# Patient Record
Sex: MALE | Race: WHITE | NOT HISPANIC OR LATINO | Employment: FULL TIME | URBAN - METROPOLITAN AREA
[De-identification: names, ages, dates, MRNs, and addresses within clinical notes are randomized per-mention and may not be internally consistent; named-entity substitution may affect disease eponyms.]

---

## 2017-03-12 ENCOUNTER — OFFICE VISIT (OUTPATIENT)
Dept: URGENT CARE | Facility: PHYSICIAN GROUP | Age: 47
End: 2017-03-12
Payer: COMMERCIAL

## 2017-03-12 VITALS
DIASTOLIC BLOOD PRESSURE: 94 MMHG | BODY MASS INDEX: 27.9 KG/M2 | SYSTOLIC BLOOD PRESSURE: 132 MMHG | HEART RATE: 70 BPM | OXYGEN SATURATION: 94 % | WEIGHT: 189 LBS | RESPIRATION RATE: 14 BRPM | TEMPERATURE: 98.5 F

## 2017-03-12 DIAGNOSIS — J06.9 UPPER RESPIRATORY TRACT INFECTION, UNSPECIFIED TYPE: ICD-10-CM

## 2017-03-12 PROCEDURE — 99214 OFFICE O/P EST MOD 30 MIN: CPT | Performed by: PHYSICIAN ASSISTANT

## 2017-03-12 RX ORDER — DEXTROMETHORPHAN HYDROBROMIDE AND PROMETHAZINE HYDROCHLORIDE 15; 6.25 MG/5ML; MG/5ML
SYRUP ORAL
Qty: 180 ML | Refills: 0 | Status: SHIPPED | OUTPATIENT
Start: 2017-03-12 | End: 2018-01-05

## 2017-03-12 RX ORDER — IBUPROFEN 600 MG/1
600 TABLET ORAL EVERY 8 HOURS PRN
Qty: 30 TAB | Refills: 0 | Status: SHIPPED | OUTPATIENT
Start: 2017-03-12

## 2017-03-12 NOTE — MR AVS SNAPSHOT
Ed Jesus   3/12/2017 1:00 PM   Office Visit   MRN: [id number]    Department:  [facility]   Dept Phone:  [phone]    Description:  Male : 1970   Provider:  Amelia Calhoun PA-C           Reason for Visit     Cold Symptoms cough, sore throat, congestion x5 days      Allergies as of 3/12/2017     Allergen Noted Reactions    Nkda [No Known Drug Allergy] 2010         Vital Signs     Blood Pressure Pulse Temperature Respirations Weight Oxygen Saturation    132/94 mmHg 70 36.9 °C (98.5 °F) 14 85.73 kg (189 lb) 94%    Smoking Status                   Never Smoker            Basic Information     Date Of Birth Sex Race Ethnicity Preferred Language    1970 Male White Non- English      Problem List              ICD-10-CM Priority Class Noted - Resolved    Other chest pain R07.89   10/14/2010 - Present    HTN (hypertension) I10   10/14/2010 - Present    Other and unspecified hyperlipidemia E78.5   2012 - Present    DM (diabetes mellitus) ([facility]) (Chronic) E11.9   2012 - Present      Health Maintenance        Date Due Completion Dates    IMM HEP B VACCINE (1 of 3 - Primary Series) 1970 ---    DIABETES MONOFILAMENT / LE EXAM 1971 ---    RETINAL SCREENING 1988 ---    URINE ACR / MICROALBUMIN 1988 ---    IMM DTaP/Tdap/Td Vaccine (1 - Tdap) 1989 ---    IMM PNEUMOCOCCAL 19-64 (ADULT) MEDIUM RISK SERIES (1 of 1 - PPSV23) 1989 ---    A1C SCREENING 2011 10/14/2010    FASTING LIPID PROFILE 2013, 10/14/2010    SERUM CREATININE 2014, 2012, 10/13/2010, 2010    IMM INFLUENZA (1) 2016 ---            Current Immunizations     No immunizations on file.      Below and/or attached are the medications your provider expects you to take. Review all of your home medications and newly ordered medications with your provider and/or pharmacist. Follow medication instructions as directed by your provider and/or pharmacist.  Please keep your medication list with you and share with your provider. Update the information when medications are discontinued, doses are changed, or new medications (including over-the-counter products) are added; and carry medication information at all times in the event of emergency situations     Allergies:  NKDA - (reactions not documented)               Medications  Valid as of: March 12, 2017 -  1:23 PM    Generic Name Brand Name Tablet Size Instructions for use    Azithromycin (Tab) ZITHROMAX 250 MG Take 2 tabs by mouth on day 1, then 1 tab on days 2-5        Benazepril HCl (Tab) LOTENSIN 20 MG Take 20 mg by mouth every day.        mag hydrox-al hydrox-simeth-diphenhydrAMINE-lidocaine viscous 2%   Take 15 mL by mouth 4 times a day as needed. Gargle and spit out after using warm salt water gargling up to 4x/day        Multiple Vitamin (Tab) MULTI-VITAMIN  Take  by mouth.        Simvastatin (Tab) ZOCOR 40 MG Take 40 mg by mouth every evening.        Zolpidem Tartrate (Tab) AMBIEN 10 MG Take 10 mg by mouth at bedtime as needed.        .                 Medicines prescribed today were sent to:     [facility] [address] [place] [id number]    Phone: [phone] Fax: [phone]    Open 24 Hours?: No      Medication refill instructions:       If your prescription bottle indicates you have medication refills left, it is not necessary to call your provider’s office. Please contact your pharmacy and they will refill your medication.    If your prescription bottle indicates you do not have any refills left, you may request refills at any time through one of the following ways: The online [facility] system (except Urgent Care), by calling your provider’s office, or by asking your pharmacy to contact your provider’s office with a refill request. Medication refills are processed only during regular business hours and may not be available until the next  business day. Your provider may request additional information or to have a follow-up visit with you prior to refilling your medication.   *Please Note: Medication refills are assigned a new Rx number when refilled electronically. Your pharmacy may indicate that no refills were authorized even though a new prescription for the same medication is available at the pharmacy. Please request the medicine by name with the pharmacy before contacting your provider for a refill.           Quick2LAUNCH Access Code: [email or web address]  Expires: 4/11/2017  1:23 PM    Your email address is not on file at [facility].  Email Addresses are required for you to sign up for Quick2LAUNCH, please contact [phone] to verify your personal information and to provide your email address prior to attempting to register for Quick2LAUNCH.    Ed Jesus  [address] [place] [address]    Quick2LAUNCH  A secure, online tool to manage your health information     curated.by [facility]’s Quick2LAUNCH® is a secure, online tool that connects you to your personalized health information from the privacy of your home -- day or night - making it very easy for you to manage your healthcare. Once the activation process is completed, you can even access your medical information using the Quick2LAUNCH tono, which is available for free in the Apple Tono store or Google Play store.     To learn more about Quick2LAUNCH, visit [email or web address]    There are two levels of access available (as shown below):   My Chart Features  [facility] Primary Care Doctor [facility]  Specialists [facility]  Urgent  Care Non-[facility] Primary Care Doctor   Email your healthcare team securely and privately 24/7 X X X    Manage appointments: schedule your next appointment; view details of past/upcoming appointments X      Request prescription refills. X      View recent personal medical records, including lab and immunizations X X X X   View health record, including health history, allergies, medications X X X X   Read  reports about your outpatient visits, procedures, consult and ER notes X X X X   See your discharge summary, which is a recap of your hospital and/or ER visit that includes your diagnosis, lab results, and care plan X X  X     How to register for S[employer]ed Apples:  Once your e-mail address has been verified, follow the following steps to sign up for Sliced Apples.     1. Go to  [email or web address]  2. Click on the Sign Up Now box, which takes you to the New Member Sign Up page. You will need to provide the following information:  a. Enter your S[employer]ed Apples Access Code exactly as it appears at the top of this page. (You will not need to use this code after you’ve completed the sign-up process. If you do not sign up before the expiration date, you must request a new code.)   b. Enter your date of birth.   c. Enter your home email address.   d. Click Submit, and follow the next screen’s instructions.  3. Create a Sliced Apples ID. This will be your Sliced Apples login ID and cannot be changed, so think of one that is secure and easy to remember.  4. Create a S[employer]ed [employer]les password. You can change your password at any time.  5. Enter your Password Reset Question and Answer. This can be used at a later time if you forget your password.   6. Enter your e-mail address. This allows you to receive e-mail notifications when new information is available in Sliced Apples.  7. Click Sign Up. You can now view your health information.    For assistance activating your Sliced Apples account, call [phone]

## 2017-03-12 NOTE — PROGRESS NOTES
CC:Patient is d____04__-lezx-use___ncpv__ who comes in with a __5__ day history of cough, nasal congestion, sore throat and post nasal drip. Denies fever, chills, nausea and vomiting. Denies any hx of asthma or other respiratory disorder. IS not a smoker.    PMH: non pertinent to this examination  PSH: non pertinent to this examination  FH: not pertinent to this examination    Medications: OTC cold medication    Allergies: knda    ROS: denies chest tightness, sob, fever, chills, nausea or vomiting, no change in urinary habbits    Blood pressure 132/94, pulse 70, temperature 36.9 °C (98.5 °F), resp. rate 14, weight 85.73 kg (189 lb), SpO2 94 %.    Physical Exam:  Vitals: are unremarkable. Patient is afebrile and O2sats are within normal range.  Gen: A and O ×3 no acute distress, well-nourished well-hydrated, nontoxic  HEENT: TMs and oropharynx are clear. nares are patent and clear. No pain to percussion in the maxillary or frontal sinus region  Neck soft supple without cervical lymphadenopathy  Cardiovascular: Regular rate and rhythm normal S1 and S2. No murmur rubs or gallops  Lungs are clear to auscultation bilaterally, no wheezes rales or rhonchi. Good inspiratory and expiratory breath sounds  Abdomen: Soft nontender nondistended with good bowel  Skin is well perfused without evidence of rash or lesions  Neurologically: No deficit noted    Assessment plan:    1. Viral upper respiratory infection: Discussed viral etiology at length. Discussed supportive care measures. Increase fluids, take meds as directed and follow-up if symptoms persist or worsen despite treatment.  -Prescribed promethazine DM  -Prescribed ibuprofen. Discussed supportive care measures

## 2017-10-19 ENCOUNTER — NON-PROVIDER VISIT (OUTPATIENT)
Dept: URGENT CARE | Facility: CLINIC | Age: 47
End: 2017-10-19

## 2017-10-19 ENCOUNTER — OFFICE VISIT (OUTPATIENT)
Dept: URGENT CARE | Facility: CLINIC | Age: 47
End: 2017-10-19

## 2017-10-19 DIAGNOSIS — Z01.89 RESPIRATORY CLEARANCE EXAMINATION, ENCOUNTER FOR: ICD-10-CM

## 2017-10-19 PROCEDURE — 94375 RESPIRATORY FLOW VOLUME LOOP: CPT | Performed by: PHYSICIAN ASSISTANT

## 2017-10-19 NOTE — PROGRESS NOTES
Ed Jesus is a 47 y.o. male here for a non-provider visit for Mask Fit/ Respiratory Clearance    If abnormal was an in office provider notified today (if so, indicate provider)? Yes  Routed to PCP? No

## 2018-01-05 ENCOUNTER — OFFICE VISIT (OUTPATIENT)
Dept: URGENT CARE | Facility: PHYSICIAN GROUP | Age: 48
End: 2018-01-05

## 2018-01-05 VITALS
SYSTOLIC BLOOD PRESSURE: 110 MMHG | WEIGHT: 201 LBS | TEMPERATURE: 98.6 F | HEART RATE: 100 BPM | BODY MASS INDEX: 29.77 KG/M2 | OXYGEN SATURATION: 96 % | DIASTOLIC BLOOD PRESSURE: 88 MMHG | HEIGHT: 69 IN | RESPIRATION RATE: 18 BRPM

## 2018-01-05 DIAGNOSIS — J10.1 INFLUENZA A: ICD-10-CM

## 2018-01-05 LAB
FLUAV+FLUBV AG SPEC QL IA: NORMAL
INT CON NEG: NEGATIVE
INT CON POS: POSITIVE

## 2018-01-05 PROCEDURE — 87804 INFLUENZA ASSAY W/OPTIC: CPT | Performed by: PHYSICIAN ASSISTANT

## 2018-01-05 PROCEDURE — 99214 OFFICE O/P EST MOD 30 MIN: CPT | Performed by: PHYSICIAN ASSISTANT

## 2018-01-05 RX ORDER — OSELTAMIVIR PHOSPHATE 75 MG/1
75 CAPSULE ORAL 2 TIMES DAILY
Qty: 10 CAP | Refills: 0 | Status: SHIPPED | OUTPATIENT
Start: 2018-01-05 | End: 2023-02-20

## 2018-01-05 NOTE — PATIENT INSTRUCTIONS
"Influenza A (H1N1)    H1N1 formerly called \"swine flu\" is a new influenza virus causing sickness in people. The H1N1 virus is different from seasonal influenza viruses. However, the H1N1 symptoms are similar to seasonal influenza and it is spread from person to person. You may be at higher risk for serious problems if you have underlying serious medical conditions. The CDC and the World Health Organization are following reported cases around the world.  CAUSES   · The flu is thought to spread mainly person-to-person through coughing or sneezing of infected people.  · A person may become infected by touching something with the virus on it and then touching their mouth or nose.  SYMPTOMS   · Fever.  · Headache.  · Tiredness.  · Cough.  · Sore throat.  · Runny or stuffy nose.  · Body aches.  · Diarrhea and vomiting  These symptoms are referred to as \"flu-like symptoms.\" A lot of different illnesses, including the common cold, may have similar symptoms.  DIAGNOSIS   · There are tests that can tell if you have the H1N1 virus.  · Confirmed cases of H1N1 will be reported to the state or local health department.  · A doctor's exam may be needed to tell whether you have an infection that is a complication of the flu.  HOME CARE INSTRUCTIONS   · Stay informed. Visit the CDC website for current recommendations. Visit www.cdc.gov/Z4P6ecj/. You may also call 8-528-DNL-INFO (1-682.360.3515).  · Get help early if you develop any of the above symptoms.  · If you are at high risk from complications of the flu, talk to your caregiver as soon as you develop flu-like symptoms. Those at higher risk for complications include:  · People 65 years or older.  · People with chronic medical conditions.  · Pregnant women.  · Young children.  · Your caregiver may recommend antiviral medicine to help treat the flu.  · If you get the flu, get plenty of rest, drink enough water and fluids to keep your urine clear or pale yellow, and avoid using " alcohol or tobacco.  · You may take over-the-counter medicine to relieve the symptoms of the flu if your caregiver approves. (Never give aspirin to children or teenagers who have flu-like symptoms, particularly fever).  TREATMENT   If you do get sick, antiviral drugs are available. These drugs can make your illness milder and make you feel better faster. Treatment should start soon after illness starts. It is only effective if taken within the first day of becoming ill. Only your caregiver can prescribe antiviral medication.   PREVENTION   · Cover your nose and mouth with a tissue or your arm when you cough or sneeze. Throw the tissue away.  · Wash your hands often with soap and warm water, especially after you cough or sneeze. Alcohol-based  are also effective against germs.  · Avoid touching your eyes, nose or mouth. This is one way germs spread.  · Try to avoid contact with sick people. Follow public health advice regarding school closures. Avoid crowds.  · Stay home if you get sick. Limit contact with others to keep from infecting them. People infected with the H1N1 virus may be able to infect others anywhere from 1 day before feeling sick to 5-7 days after getting flu symptoms.  · An H1N1 vaccine is available to help protect against the virus. In addition to the H1N1 vaccine, you will need to be vaccinated for seasonal influenza. The H1N1 and seasonal vaccines may be given on the same day. The CDC especially recommends the H1N1 vaccine for:  · Pregnant women.  · People who live with or care for children younger than 6 months of age.  · Health care and emergency services personnel.  · Persons between the ages of 6 months through 24 years of age.  · People from ages 25 through 64 years who are at higher risk for H1N1 because of chronic health disorders or immune system problems.  FACEMASKS  In community and home settings, the use of facemasks and N95 respirators are not normally recommended. In certain  circumstances, a facemask or N95 respirator may be used for persons at increased risk of severe illness from influenza. Your caregiver can give additional recommendations for facemask use.  IN CHILDREN, EMERGENCY WARNING SIGNS THAT NEED URGENT MEDICAL CARE:  · Fast breathing or trouble breathing.  · Bluish skin color.  · Not drinking enough fluids.  · Not waking up or not interacting normally.  · Being so fussy that the child does not want to be held.  · Your child has an oral temperature above 102° F (38.9° C), not controlled by medicine.  · Your baby is older than 3 months with a rectal temperature of 102° F (38.9° C) or higher.  · Your baby is 3 months old or younger with a rectal temperature of 100.4° F (38° C) or higher.  · Flu-like symptoms improve but then return with fever and worse cough.  IN ADULTS, EMERGENCY WARNING SIGNS THAT NEED URGENT MEDICAL CARE:  · Difficulty breathing or shortness of breath.  · Pain or pressure in the chest or abdomen.  · Sudden dizziness.  · Confusion.  · Severe or persistent vomiting.  · Bluish color.  · You have a oral temperature above 102° F (38.9° C), not controlled by medicine.  · Flu-like symptoms improve but return with fever and worse cough.  SEEK IMMEDIATE MEDICAL CARE IF:   You or someone you know is experiencing any of the above symptoms. When you arrive at the emergency center, report that you think you have the flu. You may be asked to wear a mask and/or sit in a secluded area to protect others from getting sick.  MAKE SURE YOU:   · Understand these instructions.  · Will watch your condition.  · Will get help right away if you are not doing well or get worse.  Some of this information courtesy of the CDC.   Document Released: 06/05/2009 Document Revised: 03/11/2013 Document Reviewed: 06/05/2009  [employer]Care® Patient Information ©2014 [employer].

## 2021-10-09 ENCOUNTER — HOSPITAL ENCOUNTER (OUTPATIENT)
Dept: LAB | Facility: MEDICAL CENTER | Age: 51
End: 2021-10-09
Attending: FAMILY MEDICINE
Payer: COMMERCIAL

## 2021-10-09 LAB
ALBUMIN SERPL BCP-MCNC: 4.3 G/DL (ref 3.2–4.9)
ALBUMIN/GLOB SERPL: 1.6 G/DL
ALP SERPL-CCNC: 73 U/L (ref 30–99)
ALT SERPL-CCNC: 27 U/L (ref 2–50)
ANION GAP SERPL CALC-SCNC: 11 MMOL/L (ref 7–16)
APPEARANCE UR: CLEAR
AST SERPL-CCNC: 26 U/L (ref 12–45)
BASOPHILS # BLD AUTO: 0.4 % (ref 0–1.8)
BASOPHILS # BLD: 0.02 K/UL (ref 0–0.12)
BILIRUB SERPL-MCNC: 0.6 MG/DL (ref 0.1–1.5)
BILIRUB UR QL STRIP.AUTO: NEGATIVE
BUN SERPL-MCNC: 20 MG/DL (ref 8–22)
CALCIUM SERPL-MCNC: 9.5 MG/DL (ref 8.5–10.5)
CHLORIDE SERPL-SCNC: 108 MMOL/L (ref 96–112)
CHOLEST SERPL-MCNC: 176 MG/DL (ref 100–199)
CO2 SERPL-SCNC: 25 MMOL/L (ref 20–33)
COLOR UR: YELLOW
CREAT SERPL-MCNC: 1.22 MG/DL (ref 0.5–1.4)
EOSINOPHIL # BLD AUTO: 0.19 K/UL (ref 0–0.51)
EOSINOPHIL NFR BLD: 4.2 % (ref 0–6.9)
ERYTHROCYTE [DISTWIDTH] IN BLOOD BY AUTOMATED COUNT: 39.1 FL (ref 35.9–50)
GLOBULIN SER CALC-MCNC: 2.7 G/DL (ref 1.9–3.5)
GLUCOSE SERPL-MCNC: 85 MG/DL (ref 65–99)
GLUCOSE UR STRIP.AUTO-MCNC: NEGATIVE MG/DL
HCT VFR BLD AUTO: 41.4 % (ref 42–52)
HDLC SERPL-MCNC: 41 MG/DL
HGB BLD-MCNC: 14.4 G/DL (ref 14–18)
IMM GRANULOCYTES # BLD AUTO: 0.03 K/UL (ref 0–0.11)
IMM GRANULOCYTES NFR BLD AUTO: 0.7 % (ref 0–0.9)
KETONES UR STRIP.AUTO-MCNC: ABNORMAL MG/DL
LDLC SERPL CALC-MCNC: 103 MG/DL
LEUKOCYTE ESTERASE UR QL STRIP.AUTO: NEGATIVE
LYMPHOCYTES # BLD AUTO: 1.83 K/UL (ref 1–4.8)
LYMPHOCYTES NFR BLD: 40.3 % (ref 22–41)
MCH RBC QN AUTO: 28.6 PG (ref 27–33)
MCHC RBC AUTO-ENTMCNC: 34.8 G/DL (ref 33.7–35.3)
MCV RBC AUTO: 82.3 FL (ref 81.4–97.8)
MICRO URNS: ABNORMAL
MONOCYTES # BLD AUTO: 0.41 K/UL (ref 0–0.85)
MONOCYTES NFR BLD AUTO: 9 % (ref 0–13.4)
NEUTROPHILS # BLD AUTO: 2.06 K/UL (ref 1.82–7.42)
NEUTROPHILS NFR BLD: 45.4 % (ref 44–72)
NITRITE UR QL STRIP.AUTO: NEGATIVE
NRBC # BLD AUTO: 0 K/UL
NRBC BLD-RTO: 0 /100 WBC
PH UR STRIP.AUTO: 5.5 [PH] (ref 5–8)
PLATELET # BLD AUTO: 241 K/UL (ref 164–446)
PMV BLD AUTO: 10.4 FL (ref 9–12.9)
POTASSIUM SERPL-SCNC: 3.6 MMOL/L (ref 3.6–5.5)
PROT SERPL-MCNC: 7 G/DL (ref 6–8.2)
PROT UR QL STRIP: NEGATIVE MG/DL
PSA SERPL-MCNC: 1.12 NG/ML (ref 0–4)
RBC # BLD AUTO: 5.03 M/UL (ref 4.7–6.1)
RBC UR QL AUTO: NEGATIVE
SODIUM SERPL-SCNC: 144 MMOL/L (ref 135–145)
SP GR UR STRIP.AUTO: 1.03
T4 FREE SERPL-MCNC: 1.52 NG/DL (ref 0.93–1.7)
TRIGL SERPL-MCNC: 158 MG/DL (ref 0–149)
TSH SERPL DL<=0.005 MIU/L-ACNC: 1.69 UIU/ML (ref 0.38–5.33)
UROBILINOGEN UR STRIP.AUTO-MCNC: 0.2 MG/DL
WBC # BLD AUTO: 4.5 K/UL (ref 4.8–10.8)

## 2021-10-09 PROCEDURE — 80061 LIPID PANEL: CPT

## 2021-10-09 PROCEDURE — 80053 COMPREHEN METABOLIC PANEL: CPT

## 2021-10-09 PROCEDURE — 36415 COLL VENOUS BLD VENIPUNCTURE: CPT

## 2021-10-09 PROCEDURE — 84153 ASSAY OF PSA TOTAL: CPT

## 2021-10-09 PROCEDURE — 84443 ASSAY THYROID STIM HORMONE: CPT

## 2021-10-09 PROCEDURE — 81003 URINALYSIS AUTO W/O SCOPE: CPT

## 2021-10-09 PROCEDURE — 84439 ASSAY OF FREE THYROXINE: CPT

## 2021-10-09 PROCEDURE — 85025 COMPLETE CBC W/AUTO DIFF WBC: CPT

## 2023-02-20 ENCOUNTER — OFFICE VISIT (OUTPATIENT)
Dept: URGENT CARE | Facility: PHYSICIAN GROUP | Age: 53
End: 2023-02-20
Payer: COMMERCIAL

## 2023-02-20 VITALS
HEART RATE: 72 BPM | DIASTOLIC BLOOD PRESSURE: 68 MMHG | RESPIRATION RATE: 18 BRPM | BODY MASS INDEX: 29.47 KG/M2 | WEIGHT: 199 LBS | HEIGHT: 69 IN | TEMPERATURE: 98.1 F | SYSTOLIC BLOOD PRESSURE: 124 MMHG | OXYGEN SATURATION: 99 %

## 2023-02-20 DIAGNOSIS — A49.1 BACTERIAL INFECTION DUE TO STREPTOCOCCUS: ICD-10-CM

## 2023-02-20 LAB
INT CON NEG: NORMAL
INT CON POS: NORMAL
S PYO AG THROAT QL: POSITIVE

## 2023-02-20 PROCEDURE — 87880 STREP A ASSAY W/OPTIC: CPT | Performed by: NURSE PRACTITIONER

## 2023-02-20 PROCEDURE — 99204 OFFICE O/P NEW MOD 45 MIN: CPT | Performed by: NURSE PRACTITIONER

## 2023-02-20 RX ORDER — AMOXICILLIN 500 MG/1
500 CAPSULE ORAL 2 TIMES DAILY
Qty: 20 CAPSULE | Refills: 0 | Status: SHIPPED | OUTPATIENT
Start: 2023-02-20 | End: 2023-03-02

## 2023-02-20 RX ORDER — BENAZEPRIL/HYDROCHLOROTHIAZIDE 20 MG-25MG
1 TABLET ORAL DAILY
COMMUNITY
Start: 2023-02-14

## 2023-02-20 ASSESSMENT — FIBROSIS 4 INDEX: FIB4 SCORE: 1.08

## 2023-02-20 NOTE — PROGRESS NOTES
Chief Complaint   Patient presents with    Cough     1 wk Cough, sore throat, stuffy nose, chest congestion, no energy       HISTORY OF PRESENT ILLNESS: Patient is a pleasant 52 y.o. male who presents today due to complaints of a sore throat for the past two weeks. Reports associated fever, chills, pain with swallowing and dry cough. Pain is moderate. Denies associated rash, chest pain, urinary complaints, congestion, or difficulty breathing. He has tried OTC medications at home without much improvement.  Reports positive exposure to strep pharyngitis.      Patient Active Problem List    Diagnosis Date Noted    DM (diabetes mellitus) ([facility]) 02/17/2012    Other and unspecified hyperlipidemia 02/08/2012    Other chest pain 10/14/2010    HTN (hypertension) 10/14/2010       Allergies:Nkda [no known drug allergy]    Current Outpatient Medications Ordered in Epic   Medication Sig Dispense Refill    ibuprofen (MOTRIN) 600 MG Tab Take 1 Tab by mouth every 8 hours as needed. 30 Tab 0    simvastatin (ZOCOR) 40 MG TABS Take 40 mg by mouth every evening.      benazepril (LOTENSIN) 20 MG TABS Take 20 mg by mouth every day.      Multiple Vitamin (MULTI-VITAMIN) TABS Take  by mouth.      benazepril-hydrochlorthiazide (LOTENSIN HCT) 20-25 MG per tablet Take 1 Tablet by mouth every day.       No current [facility]-ordered facility-administered medications on file.       Past Medical History:   Diagnosis Date    Diabetes     DM (diabetes mellitus) ([facility]) 2/17/2012    Hypertension     Renal disorder 07/2010    kidney stone       Social History     Tobacco Use    Smoking status: Never    Smokeless tobacco: Never   Substance Use Topics    Alcohol use: No    Drug use: No       No family status information on file.   History reviewed. No pertinent family history.    ROS:  Review of Systems   Constitutional: Positive for fever, chills. Negative for weight loss, malaise, and fatigue.   HENT: Positive for sore throat. Negative for ear pain,  "nosebleeds, congestion.   Eyes: Negative for vision changes.   Cardiovascular: Negative for chest pain, palpitations, orthopnea and leg swelling.   Respiratory: Positive for cough.  Negative for sputum production, shortness of breath and wheezing.   Gastrointestinal: Negative for abdominal pain, nausea, vomiting or diarrhea.   : Negative for changes in urination.   Skin: Negative for rash, diaphoresis.     Exam:  /68 (BP Location: Left arm, Patient Position: Sitting, BP Cuff Size: Adult)   Pulse 72   Temp 36.7 °C (98.1 °F) (Temporal)   Resp 18   Ht 1.753 m (5' 9\")   Wt 90.3 kg (199 lb)   SpO2 99%   General: well-nourished, well-developed male in NAD  Head: normocephalic, atraumatic  Eyes: PERRLA, no conjunctival injection, acuity grossly intact, lids normal.  Ears: normal shape and symmetry, no tenderness, no discharge. External canals are without any significant edema or erythema. Tympanic membranes are without any inflammation, no effusion. Gross auditory acuity is intact.  Nose: symmetrical without tenderness, no discharge.  Mouth/Throat: reasonable hygiene. There is erythema, with exudates and tonsillar enlargement present.  Neck: no masses, range of motion within normal limits, no tracheal deviation. No obvious thyroid enlargement.   Lymph: bilateral anterior cervical adenopathy. No supraclavicular adenopathy.   Neuro: alert and oriented. Cranial nerves 1-12 grossly intact. No sensory deficit.   Cardiovascular: regular rate and rhythm. No edema.  Pulmonary: no distress. Chest is symmetrical with respiration, no wheezes, crackles, or rhonchi.   Musculoskeletal: no clubbing, appropriate muscle tone, gait is stable.  Skin: warm, dry, intact, no clubbing, no cyanosis, no rashes.   Psych: appropriate mood, affect, judgement.       POC strep positive      Assessment/Plan:  1. Bacterial infection due to Streptococcus  POCT Rapid Strep A    amoxicillin (AMOXIL) 500 MG Cap                Antibiotic as " directed. OTC motrin or tylenol for pain/fever control. Hand hygiene. Increase fluid intake, rest. Warm salt water gargles.   Supportive care, differential diagnoses, and indications for immediate follow-up discussed with patient.   Pathogenesis of diagnosis discussed including typical length and natural progression.   Instructed to return to clinic or nearest emergency department for any change in condition, further concerns, or worsening of symptoms.  Patient states understanding of the plan of care and discharge instructions.  Instructed to make an appointment, for follow up, with his primary care provider.        Please note that this dictation was created using voice recognition software. I have made every reasonable attempt to correct obvious errors, but I expect that there are errors of grammar and possibly content that I did not discover before finalizing the note. N95 and safety glasses used for entire visit.       SULAIMAN Berry.

## 2023-10-02 ENCOUNTER — APPOINTMENT (RX ONLY)
Dept: URBAN - METROPOLITAN AREA CLINIC 4 | Facility: CLINIC | Age: 53
Setting detail: DERMATOLOGY
End: 2023-10-02

## 2023-10-02 DIAGNOSIS — D22 MELANOCYTIC NEVI: ICD-10-CM

## 2023-10-02 PROBLEM — D48.5 NEOPLASM OF UNCERTAIN BEHAVIOR OF SKIN: Status: ACTIVE | Noted: 2023-10-02

## 2023-10-02 PROCEDURE — ? BIOPSY BY SHAVE METHOD

## 2023-10-02 PROCEDURE — ? COUNSELING

## 2023-10-02 PROCEDURE — 11102 TANGNTL BX SKIN SINGLE LES: CPT

## 2023-10-02 ASSESSMENT — LOCATION ZONE DERM: LOCATION ZONE: FACE

## 2023-10-02 ASSESSMENT — LOCATION SIMPLE DESCRIPTION DERM: LOCATION SIMPLE: RIGHT ZYGOMA

## 2023-10-02 ASSESSMENT — LOCATION DETAILED DESCRIPTION DERM: LOCATION DETAILED: RIGHT MEDIAL ZYGOMA

## 2024-08-21 ENCOUNTER — APPOINTMENT (OUTPATIENT)
Dept: URGENT CARE | Facility: CLINIC | Age: 54
End: 2024-08-21

## 2024-08-21 ENCOUNTER — OFFICE VISIT (OUTPATIENT)
Dept: URGENT CARE | Facility: CLINIC | Age: 54
End: 2024-08-21
Payer: COMMERCIAL

## 2024-08-21 VITALS
BODY MASS INDEX: 28.44 KG/M2 | SYSTOLIC BLOOD PRESSURE: 96 MMHG | TEMPERATURE: 98.4 F | WEIGHT: 192 LBS | HEART RATE: 75 BPM | HEIGHT: 69 IN | OXYGEN SATURATION: 98 % | RESPIRATION RATE: 16 BRPM | DIASTOLIC BLOOD PRESSURE: 68 MMHG

## 2024-08-21 DIAGNOSIS — S86.812A STRAIN OF LEFT CALF MUSCLE: ICD-10-CM

## 2024-08-21 PROCEDURE — 99213 OFFICE O/P EST LOW 20 MIN: CPT | Performed by: STUDENT IN AN ORGANIZED HEALTH CARE EDUCATION/TRAINING PROGRAM

## 2024-08-21 PROCEDURE — 1125F AMNT PAIN NOTED PAIN PRSNT: CPT | Performed by: STUDENT IN AN ORGANIZED HEALTH CARE EDUCATION/TRAINING PROGRAM

## 2024-08-21 PROCEDURE — 3074F SYST BP LT 130 MM HG: CPT | Performed by: STUDENT IN AN ORGANIZED HEALTH CARE EDUCATION/TRAINING PROGRAM

## 2024-08-21 PROCEDURE — 3078F DIAST BP <80 MM HG: CPT | Performed by: STUDENT IN AN ORGANIZED HEALTH CARE EDUCATION/TRAINING PROGRAM

## 2024-08-21 RX ORDER — ATORVASTATIN CALCIUM 20 MG/1
20 TABLET, FILM COATED ORAL DAILY
COMMUNITY
Start: 2024-07-11

## 2024-08-21 ASSESSMENT — PAIN SCALES - GENERAL: PAINLEVEL: 9=SEVERE PAIN

## 2024-08-21 NOTE — PROGRESS NOTES
"Subjective:   Ed Jesus is a 54 y.o. male who presents for Leg Injury (L Calf )      HPI:  54-year-old male presents to the urgent care for injury to the left calf that occurred last night while playing softball.  States that he was backing up in the outfield to catch a fly ball when he felt a pop in the back of his left calf.  Was able to bear weight on the calf but is painful to do so.  States that he is able to move his ankle but he does have some reduced range of motion with this.  No radiation of pain up or down the leg.  Has noticed some swelling to the back of the calf as well.  No bruising.  Denies any past Achilles rupture.    Medications:    atorvastatin Tabs  benazepril Tabs  benazepril-hydrochlorthiazide  ibuprofen Tabs  Multi-Vitamin Tabs  simvastatin Tabs    Allergies: Nkda [no known drug allergy]    Problem List: Ed Jesus does not have any pertinent problems on file.    Surgical History:  Past Surgical History:   Procedure Laterality Date    OTHER ORTHOPEDIC SURGERY  1998    elbow surgery x 3       Past Social Hx: Ed Jesus  reports that he has never smoked. He has never used smokeless tobacco. He reports that he does not drink alcohol and does not use drugs.     Past Family Hx:  Ed Jesus family history is not on file.     Problem list, medications, and allergies reviewed by myself today in Epic.     Objective:     BP 96/68   Pulse 75   Temp 36.9 °C (98.4 °F) (Temporal)   Resp 16   Ht 1.753 m (5' 9\")   Wt 87.1 kg (192 lb)   SpO2 98%   BMI 28.35 kg/m²     Physical Exam  Vitals reviewed.   Cardiovascular:      Rate and Rhythm: Normal rate.      Pulses: Normal pulses.   Pulmonary:      Effort: Pulmonary effort is normal.   Musculoskeletal:      Comments: Left posterior calf: Swelling present.  No erythema or ecchymosis.  No induration.  Tenderness to the posterior calf with palpation.  Negative Diggs test.  Patient able to perform dorsiflexion and plantarflexion although " limited due to pain.  Able to bear weight but considerable pain and moderate limp.   Skin:     General: Skin is warm and dry.         Assessment/Plan:     Diagnosis and associated orders:     1. Strain of left calf muscle           Comments/MDM:     Patient's presentation physical exam findings are consistent with strain to the left calf.  This should be self-limited.  No obvious signs of full-thickness tear.  Achilles tendon intact.  Patient given crutches in clinic.  Advised to ice roughly every 2 hours for 20 minutes at a time for the next 72 hours.  May continue ice after that but should incorporate heat as well.  Anti-inflammatories such as ibuprofen for the next 3 to 5 days.  May use Tylenol as needed.  Discussed typical timeframe for resolution of symptoms.  Discussed range of motion exercises and light strengthening such as ankle pumps and spelling the alphabet with his ankle for range of motion.  Follow-up in urgent care if symptoms do not improve or worsen.         Differential diagnosis, natural history, supportive care, and indications for immediate follow-up discussed.    Advised the patient to follow-up with the primary care physician for recheck, reevaluation, and consideration of further management.    Please note that this dictation was created using voice recognition software. I have made a reasonable attempt to correct obvious errors, but I expect that there are errors of grammar and possibly content that I did not discover before finalizing the note.    Electronically signed by Minor Vazquez PA-C.

## 2024-08-22 ENCOUNTER — SUPERVISING PHYSICIAN REVIEW (OUTPATIENT)
Dept: URGENT CARE | Facility: CLINIC | Age: 54
End: 2024-08-22
Payer: COMMERCIAL

## 2024-12-18 ENCOUNTER — OFFICE VISIT (OUTPATIENT)
Dept: URGENT CARE | Facility: PHYSICIAN GROUP | Age: 54
End: 2024-12-18
Payer: COMMERCIAL

## 2024-12-18 VITALS
WEIGHT: 201 LBS | RESPIRATION RATE: 22 BRPM | BODY MASS INDEX: 29.77 KG/M2 | SYSTOLIC BLOOD PRESSURE: 120 MMHG | HEART RATE: 80 BPM | TEMPERATURE: 98.4 F | OXYGEN SATURATION: 95 % | DIASTOLIC BLOOD PRESSURE: 94 MMHG | HEIGHT: 69 IN

## 2024-12-18 DIAGNOSIS — R05.1 ACUTE COUGH: ICD-10-CM

## 2024-12-18 DIAGNOSIS — J06.9 VIRAL UPPER RESPIRATORY ILLNESS: ICD-10-CM

## 2024-12-18 PROCEDURE — 3074F SYST BP LT 130 MM HG: CPT | Performed by: PHYSICIAN ASSISTANT

## 2024-12-18 PROCEDURE — 3080F DIAST BP >= 90 MM HG: CPT | Performed by: PHYSICIAN ASSISTANT

## 2024-12-18 PROCEDURE — 99213 OFFICE O/P EST LOW 20 MIN: CPT | Performed by: PHYSICIAN ASSISTANT

## 2024-12-18 RX ORDER — METHYLPREDNISOLONE 4 MG/1
4 TABLET ORAL DAILY
Qty: 21 TABLET | Refills: 0 | Status: SHIPPED | OUTPATIENT
Start: 2024-12-18

## 2024-12-18 RX ORDER — BENZONATATE 100 MG/1
100 CAPSULE ORAL 3 TIMES DAILY PRN
Qty: 60 CAPSULE | Refills: 0 | Status: SHIPPED | OUTPATIENT
Start: 2024-12-18

## 2024-12-18 NOTE — PROGRESS NOTES
"  Subjective:     Ed Jesus  is a 54 y.o. male who presents for Cough (With congestion, fever, and fatigue since Friday.)       He is today with cough, sinus congestion that has been ongoing for the last 5 days.  Did have a fever during the first few days but this has fully resolved.  He has also been experiencing generalized fatigue, notes a loss of sleep due to the coughing occurring at night.  No chest pain or shortness of breath, no nausea vomit, no abdominal pain, no diarrhea.  Using over-the-counter medications for symptom support.  Notes multiple recent close sick contacts at work with similar symptoms.       ROS   Allergies   Allergen Reactions    Nkda [No Known Drug Allergy]      Past Medical History:   Diagnosis Date    Diabetes     DM (diabetes mellitus) ([facility]) 2/17/2012    Hypertension     Renal disorder 07/2010    kidney stone        Objective:   BP (!) 120/94 (BP Location: Left arm, Patient Position: Sitting, BP Cuff Size: Adult)   Pulse 80   Temp 36.9 °C (98.4 °F) (Temporal)   Resp (!) 22   Ht 1.753 m (5' 9\")   Wt 91.2 kg (201 lb)   SpO2 95%   BMI 29.68 kg/m²   Physical Exam  Vitals and nursing note reviewed.   Constitutional:       General: He is not in acute distress.     Appearance: Normal appearance. He is not ill-appearing, toxic-appearing or diaphoretic.   HENT:      Head: Normocephalic.      Right Ear: Tympanic membrane, ear canal and external ear normal. There is no impacted cerumen.      Left Ear: Tympanic membrane, ear canal and external ear normal. There is no impacted cerumen.      Nose: Congestion present. No rhinorrhea.      Mouth/Throat:      Mouth: Mucous membranes are moist.      Pharynx: No oropharyngeal exudate or posterior oropharyngeal erythema.   Eyes:      General: No scleral icterus.        Right eye: No discharge.         Left eye: No discharge.      Conjunctiva/sclera: Conjunctivae normal.   Cardiovascular:      Rate and Rhythm: Normal rate and regular rhythm. "   Pulmonary:      Effort: Pulmonary effort is normal. No respiratory distress.      Breath sounds: Normal breath sounds. No stridor. No wheezing or rhonchi.   Musculoskeletal:      Cervical back: Neck supple.   Lymphadenopathy:      Cervical: No cervical adenopathy.   Neurological:      General: No focal deficit present.      Mental Status: He is alert and oriented to person, place, and time.   Psychiatric:         Mood and Affect: Mood normal.         Behavior: Behavior normal.         Thought Content: Thought content normal.         Judgment: Judgment normal.             Diagnostic testing: None    Assessment/Plan:     Encounter Diagnoses   Name Primary?    Viral upper respiratory illness     Acute cough           Plan for care for today's complaint includes starting patient on Medrol Dosepak and Tessalon Perles for viral URI and acute cough symptom support.  Continue over-the-counter occasions for additional symptom relief.  Vital signs were stable during today's office visit, patient was overall well-appearing. Continue to monitor symptoms and return to urgent care or follow-up with primary care provider if symptoms remain ongoing.  Follow-up in the emergency department if symptoms become severe, ER precautions discussed in office today..  Prescription for Medrol Dosepak, Tessalon Perles provided.    See AVS Instructions below for written guidance provided to patient on after-visit management and care in addition to our verbal discussion during the visit.    Please note that this dictation was created using voice recognition software. I have attempted to correct all errors, but there may be sound-alike, spelling, grammar and possibly content errors that I did not discover before finalizing the note.    Rigo Astudillo PA-C

## 2025-01-16 ENCOUNTER — APPOINTMENT (OUTPATIENT)
Dept: URBAN - METROPOLITAN AREA CLINIC 22 | Facility: CLINIC | Age: 55
Setting detail: DERMATOLOGY
End: 2025-01-16

## 2025-01-16 DIAGNOSIS — L81.4 OTHER MELANIN HYPERPIGMENTATION: ICD-10-CM

## 2025-01-16 DIAGNOSIS — Z71.89 OTHER SPECIFIED COUNSELING: ICD-10-CM

## 2025-01-16 DIAGNOSIS — L82.1 OTHER SEBORRHEIC KERATOSIS: ICD-10-CM

## 2025-01-16 DIAGNOSIS — D22 MELANOCYTIC NEVI: ICD-10-CM

## 2025-01-16 DIAGNOSIS — D18.0 HEMANGIOMA: ICD-10-CM

## 2025-01-16 PROBLEM — D48.5 NEOPLASM OF UNCERTAIN BEHAVIOR OF SKIN: Status: ACTIVE | Noted: 2025-01-16

## 2025-01-16 PROBLEM — D22.5 MELANOCYTIC NEVI OF TRUNK: Status: ACTIVE | Noted: 2025-01-16

## 2025-01-16 PROBLEM — D18.01 HEMANGIOMA OF SKIN AND SUBCUTANEOUS TISSUE: Status: ACTIVE | Noted: 2025-01-16

## 2025-01-16 PROCEDURE — ? BIOPSY BY SHAVE METHOD

## 2025-01-16 PROCEDURE — 99213 OFFICE O/P EST LOW 20 MIN: CPT | Mod: 25

## 2025-01-16 PROCEDURE — ? COUNSELING

## 2025-01-16 PROCEDURE — ? SUNSCREEN RECOMMENDATIONS

## 2025-01-16 PROCEDURE — 11102 TANGNTL BX SKIN SINGLE LES: CPT

## 2025-01-16 ASSESSMENT — LOCATION SIMPLE DESCRIPTION DERM
LOCATION SIMPLE: LEFT FOREARM
LOCATION SIMPLE: RIGHT LOWER BACK
LOCATION SIMPLE: LEFT UPPER BACK
LOCATION SIMPLE: RIGHT CHEEK
LOCATION SIMPLE: LEFT FOOT
LOCATION SIMPLE: CHEST
LOCATION SIMPLE: LEFT PRETIBIAL REGION

## 2025-01-16 ASSESSMENT — LOCATION DETAILED DESCRIPTION DERM
LOCATION DETAILED: RIGHT MEDIAL INFERIOR CHEST
LOCATION DETAILED: LEFT MEDIAL HEEL
LOCATION DETAILED: RIGHT SUPERIOR LATERAL MIDBACK
LOCATION DETAILED: LEFT MEDIAL DISTAL PRETIBIAL REGION
LOCATION DETAILED: LEFT PROXIMAL DORSAL FOREARM
LOCATION DETAILED: RIGHT CENTRAL MALAR CHEEK
LOCATION DETAILED: LEFT INFERIOR UPPER BACK

## 2025-01-16 ASSESSMENT — LOCATION ZONE DERM
LOCATION ZONE: TRUNK
LOCATION ZONE: FACE
LOCATION ZONE: FEET
LOCATION ZONE: ARM
LOCATION ZONE: LEG

## 2025-01-16 NOTE — PROCEDURE: BIOPSY BY SHAVE METHOD
Detail Level: Detailed
Depth Of Biopsy: dermis
Was A Bandage Applied: Yes
Size Of Lesion In Cm: 0.3
X Size Of Lesion In Cm: 0.2
Biopsy Type: H and E
Biopsy Method: Dermablade
Anesthesia Type: 0.05% lidocaine without epinephrine
Anesthesia Volume In Cc: 0.5
Additional Anesthesia Volume In Cc (Will Not Render If 0): 0
Hemostasis: Drysol
Wound Care: Petrolatum
Dressing: bandage
Destruction After The Procedure: No
Type Of Destruction Used: Curettage
Curettage Text: The wound bed was treated with curettage after the biopsy was performed.
Cryotherapy Text: The wound bed was treated with cryotherapy after the biopsy was performed.
Electrodesiccation Text: The wound bed was treated with electrodesiccation after the biopsy was performed.
Electrodesiccation And Curettage Text: The wound bed was treated with electrodesiccation and curettage after the biopsy was performed.
Silver Nitrate Text: The wound bed was treated with silver nitrate after the biopsy was performed.
Lab: [id number]
Lab Facility: 
Medical Necessity Information: It is in your best interest to select a reason for this procedure from the list below. All of these items fulfill various CMS LCD requirements except the new and changing color options.
Consent: Written consent was obtained and risks were reviewed including but not limited to scarring, infection, bleeding, scabbing, incomplete removal, nerve damage and allergy to anesthesia.
Post-Care Instructions: I reviewed with the patient in detail post-care instructions. Patient is to keep the biopsy site dry overnight, and then apply bacitracin twice daily until healed. Patient may apply hydrogen peroxide soaks to remove any crusting.
Notification Instructions: Patient will be notified of biopsy results. However, patient instructed to call the office if not contacted within 2 weeks.
Billing Type: Third-Party Bill
Information: Selecting Yes will display possible errors in your note based on the variables you have selected. This validation is only offered as a suggestion for you. PLEASE NOTE THAT THE VALIDATION TEXT WILL BE REMOVED WHEN YOU FINALIZE YOUR NOTE. IF YOU WANT TO FAX A PRELIMINARY NOTE YOU WILL NEED TO TOGGLE THIS TO 'NO' IF YOU DO NOT WANT IT IN YOUR FAXED NOTE.

## 2025-06-02 ENCOUNTER — HOSPITAL ENCOUNTER (OUTPATIENT)
Facility: MEDICAL CENTER | Age: 55
End: 2025-06-02
Payer: COMMERCIAL

## 2025-06-02 ENCOUNTER — OFFICE VISIT (OUTPATIENT)
Dept: URGENT CARE | Facility: CLINIC | Age: 55
End: 2025-06-02
Payer: COMMERCIAL

## 2025-06-02 VITALS
WEIGHT: 203.71 LBS | DIASTOLIC BLOOD PRESSURE: 92 MMHG | OXYGEN SATURATION: 97 % | SYSTOLIC BLOOD PRESSURE: 128 MMHG | RESPIRATION RATE: 14 BRPM | TEMPERATURE: 98.7 F | BODY MASS INDEX: 30.17 KG/M2 | HEART RATE: 73 BPM | HEIGHT: 69 IN

## 2025-06-02 DIAGNOSIS — J02.9 PHARYNGITIS, UNSPECIFIED ETIOLOGY: ICD-10-CM

## 2025-06-02 DIAGNOSIS — J02.9 PHARYNGITIS, UNSPECIFIED ETIOLOGY: Primary | ICD-10-CM

## 2025-06-02 DIAGNOSIS — I10 HYPERTENSION, UNSPECIFIED TYPE: ICD-10-CM

## 2025-06-02 LAB — S PYO DNA SPEC NAA+PROBE: NOT DETECTED

## 2025-06-02 PROCEDURE — 99213 OFFICE O/P EST LOW 20 MIN: CPT

## 2025-06-02 PROCEDURE — 87070 CULTURE OTHR SPECIMN AEROBIC: CPT

## 2025-06-02 PROCEDURE — 87651 STREP A DNA AMP PROBE: CPT

## 2025-06-02 PROCEDURE — 3080F DIAST BP >= 90 MM HG: CPT

## 2025-06-02 PROCEDURE — 3074F SYST BP LT 130 MM HG: CPT

## 2025-06-02 ASSESSMENT — ENCOUNTER SYMPTOMS
MYALGIAS: 1
FEVER: 0
GASTROINTESTINAL NEGATIVE: 1
SORE THROAT: 1
EYES NEGATIVE: 1
COUGH: 1
NEUROLOGICAL NEGATIVE: 1
SHORTNESS OF BREATH: 0
CHILLS: 0

## 2025-06-02 NOTE — PROGRESS NOTES
"Subjective:     Chief Complaint   Patient presents with    Body Aches    Pharyngitis     X 3 days       HPI:  Ed Jesus is a 54 y.o. male who presents for sore throat for the past 3 days. Reports associated myalgias, pain with swallowing. Pain is moderate. Denies associated rash, chest pain, urinary complaints, congestion, cough, or difficulty breathing. He has tried OTC medications at home without much improvement. Denies known ill contacts. Denies recent foreign travel, domestic travel, or known exposure to COVID-19.         ROS:  Review of Systems   Constitutional:  Negative for chills and fever.   HENT:  Positive for sore throat.    Eyes: Negative.    Respiratory:  Positive for cough. Negative for shortness of breath.    Cardiovascular:  Negative for chest pain.   Gastrointestinal: Negative.    Genitourinary: Negative.    Musculoskeletal:  Positive for myalgias.   Skin:  Negative for rash.   Neurological: Negative.    All other systems reviewed and are negative.       CURRENT MEDICATIONS:  Encounter Medications with Dispense Information[1]    ALLERGIES:   Allergies[2]    PROBLEM LIST:    does not have any pertinent problems on file.    Allergies, Medications, & Tobacco/Substance Use were reconciled by the Medical Assistant and reviewed by myself.     Objective:   BP (!) 128/92   Pulse 73   Temp 37.1 °C (98.7 °F) (Temporal)   Resp 14   Ht 1.753 m (5' 9\")   Wt 92.4 kg (203 lb 11.3 oz)   SpO2 97%   BMI 30.08 kg/m²     Physical Exam  Constitutional:       General: He is not in acute distress.     Appearance: He is not ill-appearing or toxic-appearing.   HENT:      Mouth/Throat:      Pharynx: Posterior oropharyngeal erythema present.      Tonsils: 3+ on the right. 3+ on the left.   Cardiovascular:      Rate and Rhythm: Normal rate and regular rhythm.   Pulmonary:      Effort: Pulmonary effort is normal.      Breath sounds: Normal breath sounds.   Skin:     General: Skin is warm and dry. "   Neurological:      Mental Status: He is alert.         Assessment/Plan:   Pt's history and physical exam consistent with sore throat. Testing completed in clinic which was negative. Pt informed of results via phone call. Pt is nontoxic appearing and able to tolerate PO intake. No sign of obstruction or abscess. No muffled voice, trismus, unilateral deviation of the uvula, soft palate fullness or edema. No oral airway compromise, or drooling noted. I have low suspicion for other potential causes for pharyngitis. No associated abdominal pain. Supportive treatment and return precautions discussed.   Assessment & Plan  Pharyngitis, unspecified etiology  Orders:    POCT CEPHEID [employer] A STREP - PCR    CULTURE THROAT; Future   - Encouraged use of OTC tylenol or ibuprofen (if no contraindications) per package instructions for pain from sore throat.  - Educated on hand hygiene, increasing fluid intake, and ensuring rest.   - Advised frequent warm salt water gargles  - Encouraged soft foods and cold ice pops or warm liquids, such as broth, tea with honey and lemon, or warm soup  - Do not share forks, swabs with others  - Throw away toothbrush after 24 hrs on antibiotics, replace with new one.   - Return in 1 week if symptoms do not improve    Hypertension, unspecified type  - Discussed elevated BP with pt today.   - Educated pt to check BP at home and create a log  - Encouraged pt to make dietary modifications, such as limiting salt.  - Advised pt to f/u with PCP for further BP management         Discussed differential diagnosis, management options, risks/benefits, and alternatives to planned treatment. Pt expressed understanding and the treatment plan was agreed upon. Questions were encouraged and answered. Pt encouraged to return to urgent care as needed if new or worsening symptoms or if there is no improvement in condition. Pt educated in red flags and indications to immediately call 911 or present to the Emergency  Department. Advised the patient to follow-up with the primary care physician for recheck, reevaluation, and further management.    I personally reviewed prior external notes and test results pertinent to today's visit. I have independently reviewed and interpreted all diagnostics ordered during this visit.    Please note that this dictation was created using voice recognition software. I have made a reasonable attempt to correct obvious errors, but I expect that there are errors of grammar and possibly content that I did not discover before finalizing the note.    This note was electronically signed by YASMINE Tapia         [1]   Current Outpatient Medications   Medication Sig Refill Last Dispense    atorvastatin (LIPITOR) 20 MG Tab Take 20 mg by mouth every day.  Unknown (patient-reported)    benazepril (LOTENSIN) 20 MG TABS Take 20 mg by mouth every day.  Unknown (patient-reported)    Multiple Vitamin (MULTI-VITAMIN) TABS Take  by mouth.  Unknown (patient-reported)   [2]   Allergies  Allergen Reactions    Nkda [No Known Drug Allergy]

## 2025-06-02 NOTE — ASSESSMENT & PLAN NOTE
- Discussed elevated BP with pt today.   - Educated pt to check BP at home and create a log  - Encouraged pt to make dietary modifications, such as limiting salt.  - Advised pt to f/u with PCP for further BP management

## 2025-06-04 LAB
BACTERIA SPEC RESP CULT: NORMAL
SIGNIFICANT IND 70042: NORMAL
SITE SITE: NORMAL
SOURCE SOURCE: NORMAL

## 2025-08-28 ENCOUNTER — HOSPITAL ENCOUNTER (OUTPATIENT)
Dept: LAB | Facility: MEDICAL CENTER | Age: 55
End: 2025-08-28
Attending: FAMILY MEDICINE
Payer: COMMERCIAL

## 2025-08-28 LAB
ALBUMIN SERPL BCP-MCNC: 4.5 G/DL (ref 3.2–4.9)
ALBUMIN/GLOB SERPL: 1.6 G/DL
ALP SERPL-CCNC: 100 U/L (ref 30–99)
ALT SERPL-CCNC: 36 U/L (ref 2–50)
ANION GAP SERPL CALC-SCNC: 12 MMOL/L (ref 7–16)
AST SERPL-CCNC: 31 U/L (ref 12–45)
BASOPHILS # BLD AUTO: 0.4 % (ref 0–1.8)
BASOPHILS # BLD: 0.03 K/UL (ref 0–0.12)
BILIRUB SERPL-MCNC: 1.2 MG/DL (ref 0.1–1.5)
BUN SERPL-MCNC: 20 MG/DL (ref 8–22)
CALCIUM ALBUM COR SERPL-MCNC: 9.6 MG/DL (ref 8.5–10.5)
CALCIUM SERPL-MCNC: 10 MG/DL (ref 8.5–10.5)
CHLORIDE SERPL-SCNC: 107 MMOL/L (ref 96–112)
CHOLEST SERPL-MCNC: 193 MG/DL (ref 100–199)
CO2 SERPL-SCNC: 25 MMOL/L (ref 20–33)
CREAT SERPL-MCNC: 1.27 MG/DL (ref 0.5–1.4)
CREAT UR-MCNC: 166 MG/DL
EOSINOPHIL # BLD AUTO: 0.6 K/UL (ref 0–0.51)
EOSINOPHIL NFR BLD: 8.7 % (ref 0–6.9)
ERYTHROCYTE [DISTWIDTH] IN BLOOD BY AUTOMATED COUNT: 41.5 FL (ref 35.9–50)
GFR SERPLBLD CREATININE-BSD FMLA CKD-EPI: 67 ML/MIN/1.73 M 2
GLOBULIN SER CALC-MCNC: 2.9 G/DL (ref 1.9–3.5)
GLUCOSE SERPL-MCNC: 83 MG/DL (ref 65–99)
HCT VFR BLD AUTO: 45 % (ref 42–52)
HDLC SERPL-MCNC: 40 MG/DL
HGB BLD-MCNC: 15.6 G/DL (ref 14–18)
IMM GRANULOCYTES # BLD AUTO: 0.03 K/UL (ref 0–0.11)
IMM GRANULOCYTES NFR BLD AUTO: 0.4 % (ref 0–0.9)
LDLC SERPL CALC-MCNC: 117 MG/DL
LYMPHOCYTES # BLD AUTO: 2.48 K/UL (ref 1–4.8)
LYMPHOCYTES NFR BLD: 36 % (ref 22–41)
MCH RBC QN AUTO: 29.9 PG (ref 27–33)
MCHC RBC AUTO-ENTMCNC: 34.7 G/DL (ref 32.3–36.5)
MCV RBC AUTO: 86.4 FL (ref 81.4–97.8)
MICROALBUMIN UR-MCNC: <1.2 MG/DL
MICROALBUMIN/CREAT UR: NORMAL MG/G (ref 0–30)
MONOCYTES # BLD AUTO: 0.49 K/UL (ref 0–0.85)
MONOCYTES NFR BLD AUTO: 7.1 % (ref 0–13.4)
NEUTROPHILS # BLD AUTO: 3.26 K/UL (ref 1.82–7.42)
NEUTROPHILS NFR BLD: 47.4 % (ref 44–72)
NRBC # BLD AUTO: 0 K/UL
NRBC BLD-RTO: 0 /100 WBC (ref 0–0.2)
PLATELET # BLD AUTO: 224 K/UL (ref 164–446)
PMV BLD AUTO: 10.6 FL (ref 9–12.9)
POTASSIUM SERPL-SCNC: 3.6 MMOL/L (ref 3.6–5.5)
PROT SERPL-MCNC: 7.4 G/DL (ref 6–8.2)
RBC # BLD AUTO: 5.21 M/UL (ref 4.7–6.1)
SODIUM SERPL-SCNC: 144 MMOL/L (ref 135–145)
TRIGL SERPL-MCNC: 182 MG/DL (ref 0–149)
WBC # BLD AUTO: 6.9 K/UL (ref 4.8–10.8)

## 2025-08-28 PROCEDURE — 80053 COMPREHEN METABOLIC PANEL: CPT

## 2025-08-28 PROCEDURE — 80061 LIPID PANEL: CPT

## 2025-08-28 PROCEDURE — 85025 COMPLETE CBC W/AUTO DIFF WBC: CPT

## 2025-08-28 PROCEDURE — 82570 ASSAY OF URINE CREATININE: CPT

## 2025-08-28 PROCEDURE — 82043 UR ALBUMIN QUANTITATIVE: CPT
